# Patient Record
Sex: FEMALE | Race: WHITE | NOT HISPANIC OR LATINO | Employment: OTHER | URBAN - METROPOLITAN AREA
[De-identification: names, ages, dates, MRNs, and addresses within clinical notes are randomized per-mention and may not be internally consistent; named-entity substitution may affect disease eponyms.]

---

## 2017-05-26 ENCOUNTER — TRANSCRIBE ORDERS (OUTPATIENT)
Dept: ADMINISTRATIVE | Facility: HOSPITAL | Age: 52
End: 2017-05-26

## 2017-05-26 DIAGNOSIS — Z12.31 VISIT FOR SCREENING MAMMOGRAM: Primary | ICD-10-CM

## 2017-07-05 ENCOUNTER — APPOINTMENT (OUTPATIENT)
Dept: MAMMOGRAPHY | Facility: HOSPITAL | Age: 52
End: 2017-07-05
Attending: OBSTETRICS & GYNECOLOGY

## 2017-07-07 ENCOUNTER — HOSPITAL ENCOUNTER (OUTPATIENT)
Dept: MAMMOGRAPHY | Facility: HOSPITAL | Age: 52
Discharge: HOME | End: 2017-07-07
Attending: OBSTETRICS & GYNECOLOGY | Admitting: OBSTETRICS & GYNECOLOGY

## 2017-07-07 DIAGNOSIS — Z12.31 VISIT FOR SCREENING MAMMOGRAM: ICD-10-CM

## 2017-07-07 PROCEDURE — 77067 SCR MAMMO BI INCL CAD: CPT | Performed by: RADIOLOGY

## 2017-07-07 PROCEDURE — 77063 BREAST TOMOSYNTHESIS BI: CPT

## 2017-07-07 PROCEDURE — G0202 SCR MAMMO BI INCL CAD: HCPCS

## 2017-07-07 PROCEDURE — 77063 BREAST TOMOSYNTHESIS BI: CPT | Performed by: RADIOLOGY

## 2018-05-29 ENCOUNTER — TRANSCRIBE ORDERS (OUTPATIENT)
Dept: ADMINISTRATIVE | Facility: HOSPITAL | Age: 53
End: 2018-05-29

## 2018-05-29 DIAGNOSIS — Z12.31 VISIT FOR SCREENING MAMMOGRAM: Primary | ICD-10-CM

## 2018-07-09 ENCOUNTER — HOSPITAL ENCOUNTER (OUTPATIENT)
Dept: MAMMOGRAPHY | Facility: HOSPITAL | Age: 53
Discharge: HOME | End: 2018-07-09
Attending: OBSTETRICS & GYNECOLOGY | Admitting: OBSTETRICS & GYNECOLOGY

## 2018-07-09 DIAGNOSIS — Z12.31 VISIT FOR SCREENING MAMMOGRAM: ICD-10-CM

## 2018-07-09 PROCEDURE — 77067 SCR MAMMO BI INCL CAD: CPT | Performed by: RADIOLOGY

## 2018-07-09 PROCEDURE — 77067 SCR MAMMO BI INCL CAD: CPT

## 2018-07-09 PROCEDURE — 77063 BREAST TOMOSYNTHESIS BI: CPT

## 2018-07-09 PROCEDURE — 77063 BREAST TOMOSYNTHESIS BI: CPT | Performed by: RADIOLOGY

## 2019-08-12 ENCOUNTER — TRANSCRIBE ORDERS (OUTPATIENT)
Dept: ADMINISTRATIVE | Facility: HOSPITAL | Age: 54
End: 2019-08-12

## 2019-08-12 DIAGNOSIS — Z12.31 VISIT FOR SCREENING MAMMOGRAM: Primary | ICD-10-CM

## 2019-09-16 ENCOUNTER — HOSPITAL ENCOUNTER (OUTPATIENT)
Dept: MAMMOGRAPHY | Facility: HOSPITAL | Age: 54
Discharge: HOME | End: 2019-09-16
Admitting: OBSTETRICS & GYNECOLOGY

## 2019-09-16 DIAGNOSIS — Z12.31 VISIT FOR SCREENING MAMMOGRAM: ICD-10-CM

## 2019-09-16 PROCEDURE — 77067 SCR MAMMO BI INCL CAD: CPT

## 2019-09-16 PROCEDURE — 77063 BREAST TOMOSYNTHESIS BI: CPT | Performed by: RADIOLOGY

## 2019-09-16 PROCEDURE — 77067 SCR MAMMO BI INCL CAD: CPT | Performed by: RADIOLOGY

## 2019-09-16 PROCEDURE — 77063 BREAST TOMOSYNTHESIS BI: CPT

## 2020-07-27 ENCOUNTER — TRANSCRIBE ORDERS (OUTPATIENT)
Dept: OBSTETRICS AND GYNECOLOGY | Facility: CLINIC | Age: 55
End: 2020-07-27

## 2020-07-27 DIAGNOSIS — Z12.31 VISIT FOR SCREENING MAMMOGRAM: Primary | ICD-10-CM

## 2020-10-14 ENCOUNTER — APPOINTMENT (OUTPATIENT)
Dept: MAMMOGRAPHY | Facility: HOSPITAL | Age: 55
End: 2020-10-14

## 2020-11-13 ENCOUNTER — HOSPITAL ENCOUNTER (OUTPATIENT)
Dept: MAMMOGRAPHY | Facility: HOSPITAL | Age: 55
Discharge: HOME | End: 2020-11-13
Admitting: OBSTETRICS & GYNECOLOGY

## 2020-11-13 DIAGNOSIS — Z12.31 VISIT FOR SCREENING MAMMOGRAM: ICD-10-CM

## 2020-11-13 PROCEDURE — 77063 BREAST TOMOSYNTHESIS BI: CPT

## 2020-11-13 PROCEDURE — 77063 BREAST TOMOSYNTHESIS BI: CPT | Performed by: RADIOLOGY

## 2020-11-13 PROCEDURE — 77067 SCR MAMMO BI INCL CAD: CPT | Performed by: RADIOLOGY

## 2020-11-13 PROCEDURE — 77067 SCR MAMMO BI INCL CAD: CPT

## 2020-12-09 ENCOUNTER — APPOINTMENT (OUTPATIENT)
Dept: MAMMOGRAPHY | Facility: HOSPITAL | Age: 55
End: 2020-12-09

## 2021-08-20 ENCOUNTER — OFFICE VISIT (OUTPATIENT)
Dept: OBSTETRICS AND GYNECOLOGY | Facility: CLINIC | Age: 56
End: 2021-08-20

## 2021-08-20 VITALS
BODY MASS INDEX: 26.07 KG/M2 | WEIGHT: 124.2 LBS | SYSTOLIC BLOOD PRESSURE: 110 MMHG | DIASTOLIC BLOOD PRESSURE: 70 MMHG | HEIGHT: 58 IN

## 2021-08-20 DIAGNOSIS — Z01.419 ENCOUNTER FOR ANNUAL ROUTINE GYNECOLOGICAL EXAMINATION: Primary | ICD-10-CM

## 2021-08-20 DIAGNOSIS — N88.2 CERVICAL STENOSIS (UTERINE CERVIX): ICD-10-CM

## 2021-08-20 DIAGNOSIS — R87.810 CERVICAL HIGH RISK HUMAN PAPILLOMAVIRUS (HPV) DNA TEST POSITIVE: ICD-10-CM

## 2021-08-20 DIAGNOSIS — Z12.31 ENCOUNTER FOR SCREENING MAMMOGRAM FOR MALIGNANT NEOPLASM OF BREAST: ICD-10-CM

## 2021-08-20 DIAGNOSIS — D06.9 SEVERE DYSPLASIA OF CERVIX (CIN III): ICD-10-CM

## 2021-08-20 DIAGNOSIS — Z12.11 SCREENING FOR COLON CANCER: ICD-10-CM

## 2021-08-20 PROCEDURE — 99396 PREV VISIT EST AGE 40-64: CPT | Performed by: OBSTETRICS & GYNECOLOGY

## 2021-08-20 RX ORDER — LANOLIN ALCOHOL/MO/W.PET/CERES
1000 CREAM (GRAM) TOPICAL DAILY
COMMUNITY

## 2021-08-20 RX ORDER — DIPHENOXYLATE HYDROCHLORIDE AND ATROPINE SULFATE 2.5; .025 MG/1; MG/1
TABLET ORAL DAILY
COMMUNITY

## 2021-08-20 RX ORDER — SODIUM PHOSPHATE,MONO-DIBASIC 19G-7G/118
ENEMA (ML) RECTAL
COMMUNITY

## 2021-08-20 RX ORDER — CETIRIZINE HYDROCHLORIDE 10 MG/1
10 TABLET ORAL DAILY
COMMUNITY

## 2021-08-20 RX ORDER — SPIRONOLACTONE 100 MG/1
TABLET, FILM COATED ORAL
COMMUNITY
Start: 2021-08-15

## 2021-08-20 NOTE — PROGRESS NOTES
GYN Annual Exam     CC - Here for annual exam.        HPI  Janis Klein is a 55 y.o. female, , who presents for annual well woman exam.  She is postmenopausal.  Patient denies vaginal bleeding. ..  Patient reports problems with: hot flashes. There were no changes to her medical or surgical history since her last visit.. Partner Status: Marital Status: single.  New Partners since last visit: no.    Patient requests screening lab work today; states she is fasting.  Denies any problems with vaginal dryness or severe hot flashes and declines hormone placement therapy.    Additional OB/GYN History   Current contraception: contraceptive methods: Post menopausal status  Desires to: do not start contraception  On HRT? No  Last Pap :   Last Completed Pap Smear     This patient has no relevant Health Maintenance data.        History of abnormal Pap smear: Yes  Family history of uterine, colon, breast, or ovarian cancer: no  Performs monthly Self-Breast Exam: yes  Last mammogram:   Last Completed Mammogram          MAMMOGRAM (Every 2 Years) Next due on 2022  Mammo Screening Digital Tomosynthesis Bilateral With CAD    2019  Mammo Screening Digital Tomosynthesis Bilateral With CAD    2018  Mammo Screening Digital Tomosynthesis Bilateral With CAD    2017  Mammo Screening Digital Tomosynthesis Bilateral With CAD    2016  Mammo screening digital tomosynthesis bilateral    Only the first 5 history entries have been loaded, but more history exists.              Last colonoscopy:   Last Completed Colonoscopy     This patient has no relevant Health Maintenance data.        Last DEXA: None  Exercises Regularly: yes  Feelings of Anxiety or Depression: no      Tobacco Usage?: No   OB History        2    Para   2    Term   2            AB        Living   2       SAB        TAB        Ectopic        Molar        Multiple        Live Births   2                Health  "Maintenance   Topic Date Due   • Annual Gynecologic Pelvic and Breast Exam  Never done   • COLORECTAL CANCER SCREENING  Never done   • ANNUAL PHYSICAL  Never done   • TDAP/TD VACCINES (1 - Tdap) Never done   • ZOSTER VACCINE (1 of 2) Never done   • HEPATITIS C SCREENING  Never done   • PAP SMEAR  Never done   • INFLUENZA VACCINE  10/01/2021   • MAMMOGRAM  11/13/2022   • COVID-19 Vaccine  Completed   • Pneumococcal Vaccine 0-64  Aged Out       The additional following portions of the patient's history were reviewed and updated as appropriate: allergies, current medications, past family history, past medical history, past social history, past surgical history and problem list.    Review of Systems   Constitutional: Negative.    HENT: Negative.    Eyes: Negative.    Respiratory: Negative.    Cardiovascular: Negative.    Gastrointestinal: Negative.    Endocrine: Negative.    Genitourinary: Negative.    Musculoskeletal: Negative.    Skin: Negative.    Allergic/Immunologic: Negative.    Neurological: Negative.    Hematological: Negative.    Psychiatric/Behavioral: Negative.        I have reviewed and agree with the HPI, ROS, and historical information as entered above. Matias Ruiz MD    Objective   /70   Ht 147.3 cm (58\")   Wt 56.3 kg (124 lb 3.2 oz)   BMI 25.96 kg/m²     Physical Exam  Vitals and nursing note reviewed. Exam conducted with a chaperone present.   Constitutional:       Appearance: She is well-developed.   HENT:      Head: Normocephalic and atraumatic.   Neck:      Thyroid: No thyroid mass or thyromegaly.   Cardiovascular:      Rate and Rhythm: Normal rate and regular rhythm.      Heart sounds: No murmur heard.     Pulmonary:      Effort: Pulmonary effort is normal. No retractions.      Breath sounds: Normal breath sounds. No wheezing, rhonchi or rales.   Chest:      Chest wall: No mass or tenderness.      Breasts:         Right: Normal. No mass, nipple discharge, skin change or tenderness. "         Left: Normal. No mass, nipple discharge, skin change or tenderness.   Abdominal:      General: Bowel sounds are normal.      Palpations: Abdomen is soft. Abdomen is not rigid. There is no mass.      Tenderness: There is no abdominal tenderness. There is no guarding.      Hernia: No hernia is present. There is no hernia in the left inguinal area.   Genitourinary:     Labia:         Right: No rash, tenderness or lesion.         Left: No rash, tenderness or lesion.       Vagina: Normal. No vaginal discharge or lesions.      Cervix: No cervical motion tenderness, discharge, lesion or cervical bleeding.      Uterus: Normal. Not enlarged, not fixed and not tender.       Adnexa:         Right: No mass or tenderness.          Left: No mass or tenderness.        Rectum: No external hemorrhoid.   Musculoskeletal:      Cervical back: Normal range of motion. No muscular tenderness.   Neurological:      Mental Status: She is alert and oriented to person, place, and time.   Psychiatric:         Behavior: Behavior normal.            Assessment and Plan    Problem List Items Addressed This Visit     Screening for colon cancer    Overview     Had colonoscopy at [facility]. [place] at age 48.  Benign polyps were noted.  Repeat in 5 years recommended.  Patient wishes to schedule screening colonoscopy.         Relevant Orders    Ambulatory Referral to Gastroenterology (Completed)    Encounter for screening mammogram for malignant neoplasm of breast    Overview     Last mammogram 11/13/2020 was negative.  Has mammogram scheduled for November.         Severe dysplasia of cervix (ODILIA III)    Overview     Cone biopsy 5/27/2014.  Margins and ECC were negative.         Cervical high risk human papillomavirus (HPV) DNA test positive    Overview     2014.  HPV 16/18 were negative.         Cervical stenosis (uterine cervix)    Overview     Cervix flush with vaginal wall and os stenotic post cone biopsy.           Other Visit Diagnoses      Encounter for annual routine gynecological examination    -  Primary    Relevant Orders    Pap IG, Rfx HPV ASCU    Lipid Panel    CBC (No Diff)    Hemoglobin A1c    Vitamin D 25 Hydroxy    Comprehensive Metabolic Panel          1. GYN annual well woman exam.  55 years of age.  Mild hot flashes otherwise doing well.  2. Has mammogram scheduled in November.  3. Wishes to schedule colonoscopy at [facility].  4. Reviewed monthly self breast exams.  Instructed to call with lumps, pain, or breast discharge.  Yearly mammograms ordered.  5. Ordered mammogram today.  6. Recommended use of Vitamin D and getting adequate calcium in her diet. (1500mg)  7. Colonoscopy recommended.  8. Reccommended Flu Vaccine in Fall of each year.  9. RTC in 1 year or PRN with problems.  10. Other: Patient requesting screening lab work today.    Matias Ruiz MD  08/20/2021

## 2021-08-21 LAB
25(OH)D3+25(OH)D2 SERPL-MCNC: 68 NG/ML (ref 30–100)
ALBUMIN SERPL-MCNC: 4.9 G/DL (ref 3.5–5.2)
ALBUMIN/GLOB SERPL: 2 G/DL
ALP SERPL-CCNC: 101 U/L (ref 39–117)
ALT SERPL-CCNC: 16 U/L (ref 1–33)
AST SERPL-CCNC: 24 U/L (ref 1–32)
BILIRUB SERPL-MCNC: 0.5 MG/DL (ref 0–1.2)
BUN SERPL-MCNC: 16 MG/DL (ref 6–20)
BUN/CREAT SERPL: 19.5 (ref 7–25)
CALCIUM SERPL-MCNC: 10 MG/DL (ref 8.6–10.5)
CHLORIDE SERPL-SCNC: 101 MMOL/L (ref 98–107)
CHOLEST SERPL-MCNC: 246 MG/DL (ref 0–200)
CO2 SERPL-SCNC: 27.5 MMOL/L (ref 22–29)
CREAT SERPL-MCNC: 0.82 MG/DL (ref 0.57–1)
ERYTHROCYTE [DISTWIDTH] IN BLOOD BY AUTOMATED COUNT: 12.7 % (ref 12.3–15.4)
GLOBULIN SER CALC-MCNC: 2.4 GM/DL
GLUCOSE SERPL-MCNC: 71 MG/DL (ref 65–99)
HBA1C MFR BLD: 5.1 % (ref 4.8–5.6)
HCT VFR BLD AUTO: 43.5 % (ref 34–46.6)
HDLC SERPL-MCNC: 48 MG/DL (ref 40–60)
HGB BLD-MCNC: 14.1 G/DL (ref 12–15.9)
LDLC SERPL CALC-MCNC: 179 MG/DL (ref 0–100)
MCH RBC QN AUTO: 30.5 PG (ref 26.6–33)
MCHC RBC AUTO-ENTMCNC: 32.4 G/DL (ref 31.5–35.7)
MCV RBC AUTO: 94.2 FL (ref 79–97)
PLATELET # BLD AUTO: 260 10*3/MM3 (ref 140–450)
POTASSIUM SERPL-SCNC: 4.1 MMOL/L (ref 3.5–5.2)
PROT SERPL-MCNC: 7.3 G/DL (ref 6–8.5)
RBC # BLD AUTO: 4.62 10*6/MM3 (ref 3.77–5.28)
SODIUM SERPL-SCNC: 136 MMOL/L (ref 136–145)
TRIGL SERPL-MCNC: 109 MG/DL (ref 0–150)
VLDLC SERPL CALC-MCNC: 19 MG/DL (ref 5–40)
WBC # BLD AUTO: 8.04 10*3/MM3 (ref 3.4–10.8)

## 2021-08-31 DIAGNOSIS — Z01.419 ENCOUNTER FOR ANNUAL ROUTINE GYNECOLOGICAL EXAMINATION: ICD-10-CM

## 2021-08-31 PROBLEM — Z12.4 SCREENING FOR CERVICAL CANCER: Status: ACTIVE | Noted: 2021-08-31

## 2021-09-06 ENCOUNTER — TRANSCRIBE ORDERS (OUTPATIENT)
Dept: LAB | Facility: HOSPITAL | Age: 56
End: 2021-09-06

## 2021-09-06 ENCOUNTER — LAB (OUTPATIENT)
Dept: LAB | Facility: HOSPITAL | Age: 56
End: 2021-09-06

## 2021-09-06 DIAGNOSIS — Z20.822 COVID-19 RULED OUT: ICD-10-CM

## 2021-09-06 DIAGNOSIS — Z20.822 COVID-19 RULED OUT: Primary | ICD-10-CM

## 2021-09-06 LAB — SARS-COV-2 RNA NOSE QL NAA+PROBE: NOT DETECTED

## 2021-09-06 PROCEDURE — C9803 HOPD COVID-19 SPEC COLLECT: HCPCS

## 2021-09-06 PROCEDURE — U0004 COV-19 TEST NON-CDC HGH THRU: HCPCS

## 2021-12-22 ENCOUNTER — TRANSCRIBE ORDERS (OUTPATIENT)
Dept: ADMINISTRATIVE | Facility: HOSPITAL | Age: 56
End: 2021-12-22

## 2021-12-22 DIAGNOSIS — Z12.31 ENCOUNTER FOR SCREENING MAMMOGRAM FOR MALIGNANT NEOPLASM OF BREAST: Primary | ICD-10-CM

## 2022-02-18 ENCOUNTER — HOSPITAL ENCOUNTER (OUTPATIENT)
Dept: MAMMOGRAPHY | Facility: HOSPITAL | Age: 57
Discharge: HOME | End: 2022-02-18
Admitting: OBSTETRICS & GYNECOLOGY

## 2022-02-18 DIAGNOSIS — Z12.31 ENCOUNTER FOR SCREENING MAMMOGRAM FOR MALIGNANT NEOPLASM OF BREAST: ICD-10-CM

## 2022-02-18 PROCEDURE — 77067 SCR MAMMO BI INCL CAD: CPT | Performed by: RADIOLOGY

## 2022-02-18 PROCEDURE — 77063 BREAST TOMOSYNTHESIS BI: CPT

## 2022-02-18 PROCEDURE — 77067 SCR MAMMO BI INCL CAD: CPT

## 2022-02-18 PROCEDURE — 77063 BREAST TOMOSYNTHESIS BI: CPT | Performed by: RADIOLOGY

## 2022-09-13 ENCOUNTER — OFFICE VISIT (OUTPATIENT)
Dept: OBSTETRICS AND GYNECOLOGY | Facility: CLINIC | Age: 57
End: 2022-09-13

## 2022-09-13 VITALS
DIASTOLIC BLOOD PRESSURE: 64 MMHG | HEIGHT: 58 IN | WEIGHT: 133.6 LBS | SYSTOLIC BLOOD PRESSURE: 108 MMHG | BODY MASS INDEX: 28.04 KG/M2

## 2022-09-13 DIAGNOSIS — Z01.419 WOMEN'S ANNUAL ROUTINE GYNECOLOGICAL EXAMINATION: Primary | ICD-10-CM

## 2022-09-13 DIAGNOSIS — R53.83 FATIGUE, UNSPECIFIED TYPE: ICD-10-CM

## 2022-09-13 DIAGNOSIS — Z00.00 ROUTINE GENERAL MEDICAL EXAMINATION AT A HEALTH CARE FACILITY: ICD-10-CM

## 2022-09-13 PROCEDURE — 99396 PREV VISIT EST AGE 40-64: CPT

## 2022-09-13 RX ORDER — OMALIZUMAB 150 MG/ML
INJECTION, SOLUTION SUBCUTANEOUS
COMMUNITY

## 2022-09-13 RX ORDER — EPINEPHRINE 0.3 MG/.3ML
INJECTION SUBCUTANEOUS
COMMUNITY

## 2022-09-13 NOTE — PROGRESS NOTES
Gynecologic Annual Exam Note        GYN Annual Exam     CC - Here for annual exam.        HPI  Janis Klein is a 56 y.o. female, , who presents for annual well woman exam as a established patient.  She is postmenopausal. Denies vaginal bleeding.  Patient reports problems with: weight gain . There were no changes to her medical or surgical history since her last visit.. Partner Status: Marital Status: .  She is is not currently sexually active. STD testing recommendations have been explained to the patient and she does not desire STD testing.    Additional OB/GYN History   On HRT? No    Last Pap : 21. Results: negative. HPV: not done  Last Completed Pap Smear          Ordered - PAP SMEAR (Every 3 Years) Ordered on 2021  SCANNED - PAP SMEAR              History of abnormal Pap smear: yes - years ago   Family history of uterine, colon, breast, or ovarian cancer: no  Performs monthly Self-Breast Exam: yes  Last mammogram: 2022. Done at .    Last Completed Mammogram          MAMMOGRAM (Every 2 Years) Next due on 2022  Mammo Screening Digital Tomosynthesis Bilateral With CAD    2020  Mammo Screening Digital Tomosynthesis Bilateral With CAD    2019  Mammo Screening Digital Tomosynthesis Bilateral With CAD    2018  Mammo Screening Digital Tomosynthesis Bilateral With CAD    2017  Mammo Screening Digital Tomosynthesis Bilateral With CAD    Only the first 5 history entries have been loaded, but more history exists.              Last colonoscopy: has had a colonoscopy 4 month(s) ago. Scanned in media 2022. Rec 10 yr f/u    Last Completed Colonoscopy     This patient has no relevant Health Maintenance data.            Last bone density scan (DEXA): None  Exercises Regularly: yes  Feelings of Anxiety or Depression: no      Tobacco Usage?: No       Current Outpatient Medications:   •  ALLERGY SERUM SUBLINGUAL DROPS, Place   under the tongue 1 (One) Time., Disp: , Rfl:   •  cetirizine (zyrTEC) 10 MG tablet, Take 10 mg by mouth Daily., Disp: , Rfl:   •  EPINEPHrine (EPIPEN) 0.3 MG/0.3ML solution auto-injector injection, Auvi-Q 0.3 mg/0.3 mL injection, auto-injector  Take 1 auto by injection route., Disp: , Rfl:   •  glucosamine-chondroitin 500-400 MG capsule capsule, Take  by mouth 3 (Three) Times a Day With Meals., Disp: , Rfl:   •  multivitamin (THERAGRAN) tablet tablet, Take  by mouth Daily., Disp: , Rfl:   •  omalizumab (Xolair) 150 MG/ML solution prefilled syringe injection, Xolair 150 mg/mL subcutaneous syringe, Disp: , Rfl:   •  spironolactone (ALDACTONE) 100 MG tablet, , Disp: , Rfl:   •  Zinc Sulfate (ZINC 15 PO), Take  by mouth., Disp: , Rfl:   •  vitamin B-12 (CYANOCOBALAMIN) 1000 MCG tablet, Take 1,000 mcg by mouth Daily., Disp: , Rfl:     Patient denies the need for medication refills today.    OB History        2    Para   2    Term   2            AB        Living   2       SAB        IAB        Ectopic        Molar        Multiple        Live Births   2                History reviewed. No pertinent past medical history.     Past Surgical History:   Procedure Laterality Date   • BREAST BIOPSY Right     Stereotactic       Health Maintenance   Topic Date Due   • COLORECTAL CANCER SCREENING  Never done   • TDAP/TD VACCINES (1 - Tdap) Never done   • ZOSTER VACCINE (1 of 2) Never done   • HEPATITIS C SCREENING  Never done   • COVID-19 Vaccine (4 - Booster for Moderna series) 2022   • INFLUENZA VACCINE  10/01/2022   • ANNUAL PHYSICAL  2023   • Annual Gynecologic Pelvic and Breast Exam  2023   • MAMMOGRAM  2024   • PAP SMEAR  2024   • Pneumococcal Vaccine 0-64  Aged Out       The additional following portions of the patient's history were reviewed and updated as appropriate: allergies, current medications, past family history, past medical history, past social history and past  "surgical history.    Review of Systems   Constitutional: Positive for fatigue and unexpected weight gain (Approx 15 lbs over past yr). Negative for activity change, appetite change, chills, diaphoresis and fever.   Respiratory: Negative.  Negative for shortness of breath.    Cardiovascular: Negative.  Negative for chest pain.   Gastrointestinal: Negative.    Endocrine: Positive for heat intolerance. Negative for cold intolerance.   Genitourinary: Negative for breast discharge, breast lump, breast pain and vaginal bleeding.   Allergic/Immunologic:        Severe allergies. Xolair inj Q 28 days   Psychiatric/Behavioral: Negative.  Negative for depressed mood. The patient is not nervous/anxious.        I have reviewed and agree with the HPI, ROS, and historical information as entered above. Pili Roberts, APRN    Objective   /64 (BP Location: Left arm, Patient Position: Sitting, Cuff Size: Adult)   Ht 147.3 cm (57.99\")   Wt 60.6 kg (133 lb 9.6 oz)   Breastfeeding No   BMI 27.93 kg/m²     Physical Exam  Vitals and nursing note reviewed. Exam conducted with a chaperone present.   Constitutional:       Appearance: Normal appearance. She is well-developed.   HENT:      Head: Normocephalic and atraumatic.   Neck:      Thyroid: No thyroid mass or thyromegaly.   Cardiovascular:      Rate and Rhythm: Normal rate and regular rhythm.      Pulses: Normal pulses.      Heart sounds: Normal heart sounds, S1 normal and S2 normal. No murmur heard.  Pulmonary:      Effort: Pulmonary effort is normal. No retractions.      Breath sounds: Normal breath sounds. No wheezing, rhonchi or rales.   Chest:      Chest wall: No mass or tenderness.   Breasts:      Right: Normal. No mass, nipple discharge, skin change or tenderness.      Left: Normal. No mass, nipple discharge, skin change or tenderness.       Abdominal:      General: Bowel sounds are normal.      Palpations: Abdomen is soft. Abdomen is not rigid. There is no mass.      " Tenderness: There is no abdominal tenderness. There is no guarding.      Hernia: No hernia is present. There is no hernia in the left inguinal area.   Genitourinary:     General: Normal vulva.      Labia:         Right: No rash, tenderness or lesion.         Left: No rash, tenderness or lesion.       Vagina: Normal. No vaginal discharge or lesions.      Cervix: No cervical motion tenderness, discharge, lesion or cervical bleeding.      Uterus: Normal. Not enlarged, not fixed and not tender.       Adnexa:         Right: No mass or tenderness.          Left: No mass or tenderness.        Rectum: No external hemorrhoid.   Musculoskeletal:         General: Normal range of motion.      Cervical back: Normal range of motion. No muscular tenderness.   Lymphadenopathy:      Cervical: No cervical adenopathy.      Right cervical: No superficial cervical adenopathy.     Left cervical: No superficial cervical adenopathy.   Neurological:      Mental Status: She is alert and oriented to person, place, and time.   Psychiatric:         Mood and Affect: Mood normal.         Behavior: Behavior normal.            Assessment and Plan    Problem List Items Addressed This Visit    None     Visit Diagnoses     Women's annual routine gynecological examination    -  Primary    Relevant Orders    LIQUID-BASED PAP SMEAR, [facility] ([facility])    CBC & Differential    Comprehensive Metabolic Panel    Vitamin D 25 Hydroxy    TSH Rfx On Abnormal To Free T4    Fatigue, unspecified type        Relevant Orders    CBC & Differential    Comprehensive Metabolic Panel    Vitamin D 25 Hydroxy    TSH Rfx On Abnormal To Free T4    Routine general medical examination at a health care facility        Relevant Orders    LIQUID-BASED PAP SMEAR, [facility] ([facility])    CBC & Differential    Comprehensive Metabolic Panel    Vitamin D 25 Hydroxy    TSH Rfx On Abnormal To Free T4          1. GYN annual well woman exam.   2. Reviewed monthly self breast exams.   Instructed to call with lumps, pain, or breast discharge.  Yearly mammograms ordered.  3. Recommend annual mammograms  4. Recommended use of Vitamin D and getting adequate calcium in her diet. (1500mg)- Bone health information sheet given to patient.   5. Reviewed exercise as a preventative health measures.   6. Reviewed BMI and weight loss as preventative health measures.   7. Colonoscopy 5/27/2022. Recommend  10 yr f/u.  8. Symptoms of menopausal transition reviewed with patient.  Reviewed risks/benefits of OTC, non-hormonal and hormonal treatment for vasomotor and other menopausal symptoms.  9. Labs ordered.   10. Return in about 1 year (around 9/13/2023) for Annual physical or sooner if needed.     Pili Roberts, APRN  09/13/2022

## 2022-09-14 LAB
25(OH)D3+25(OH)D2 SERPL-MCNC: 56.2 NG/ML (ref 30–100)
ALBUMIN SERPL-MCNC: 4.8 G/DL (ref 3.5–5.2)
ALBUMIN/GLOB SERPL: 2.3 G/DL
ALP SERPL-CCNC: 110 U/L (ref 39–117)
ALT SERPL-CCNC: 16 U/L (ref 1–33)
AST SERPL-CCNC: 21 U/L (ref 1–32)
BASOPHILS # BLD AUTO: 0.04 10*3/MM3 (ref 0–0.2)
BASOPHILS NFR BLD AUTO: 0.6 % (ref 0–1.5)
BILIRUB SERPL-MCNC: <0.2 MG/DL (ref 0–1.2)
BUN SERPL-MCNC: 18 MG/DL (ref 6–20)
BUN/CREAT SERPL: 25.7 (ref 7–25)
CALCIUM SERPL-MCNC: 9.1 MG/DL (ref 8.6–10.5)
CHLORIDE SERPL-SCNC: 99 MMOL/L (ref 98–107)
CO2 SERPL-SCNC: 29.3 MMOL/L (ref 22–29)
CREAT SERPL-MCNC: 0.7 MG/DL (ref 0.57–1)
EGFRCR-CYS SERPLBLD CKD-EPI 2021: 100.4 ML/MIN/1.73
EOSINOPHIL # BLD AUTO: 0.07 10*3/MM3 (ref 0–0.4)
EOSINOPHIL NFR BLD AUTO: 1 % (ref 0.3–6.2)
ERYTHROCYTE [DISTWIDTH] IN BLOOD BY AUTOMATED COUNT: 12.8 % (ref 12.3–15.4)
GLOBULIN SER CALC-MCNC: 2.1 GM/DL
GLUCOSE SERPL-MCNC: 75 MG/DL (ref 65–99)
HCT VFR BLD AUTO: 41.9 % (ref 34–46.6)
HGB BLD-MCNC: 13.7 G/DL (ref 12–15.9)
IMM GRANULOCYTES # BLD AUTO: 0.02 10*3/MM3 (ref 0–0.05)
IMM GRANULOCYTES NFR BLD AUTO: 0.3 % (ref 0–0.5)
LYMPHOCYTES # BLD AUTO: 2.47 10*3/MM3 (ref 0.7–3.1)
LYMPHOCYTES NFR BLD AUTO: 34.5 % (ref 19.6–45.3)
MCH RBC QN AUTO: 30.7 PG (ref 26.6–33)
MCHC RBC AUTO-ENTMCNC: 32.7 G/DL (ref 31.5–35.7)
MCV RBC AUTO: 93.9 FL (ref 79–97)
MONOCYTES # BLD AUTO: 0.45 10*3/MM3 (ref 0.1–0.9)
MONOCYTES NFR BLD AUTO: 6.3 % (ref 5–12)
NEUTROPHILS # BLD AUTO: 4.11 10*3/MM3 (ref 1.7–7)
NEUTROPHILS NFR BLD AUTO: 57.3 % (ref 42.7–76)
NRBC BLD AUTO-RTO: 0 /100 WBC (ref 0–0.2)
PLATELET # BLD AUTO: 234 10*3/MM3 (ref 140–450)
POTASSIUM SERPL-SCNC: 4 MMOL/L (ref 3.5–5.2)
PROT SERPL-MCNC: 6.9 G/DL (ref 6–8.5)
RBC # BLD AUTO: 4.46 10*6/MM3 (ref 3.77–5.28)
REF LAB TEST METHOD: NORMAL
SODIUM SERPL-SCNC: 138 MMOL/L (ref 136–145)
TSH SERPL DL<=0.005 MIU/L-ACNC: 2.88 UIU/ML (ref 0.27–4.2)
WBC # BLD AUTO: 7.16 10*3/MM3 (ref 3.4–10.8)

## 2023-07-11 PROBLEM — Z00.00 WELL ADULT EXAM: Status: ACTIVE | Noted: 2021-08-20

## 2023-09-20 ENCOUNTER — OFFICE VISIT (OUTPATIENT)
Dept: OBSTETRICS AND GYNECOLOGY | Facility: CLINIC | Age: 58
End: 2023-09-20
Payer: COMMERCIAL

## 2023-09-20 VITALS
BODY MASS INDEX: 28.42 KG/M2 | HEIGHT: 58 IN | SYSTOLIC BLOOD PRESSURE: 118 MMHG | DIASTOLIC BLOOD PRESSURE: 74 MMHG | WEIGHT: 135.4 LBS

## 2023-09-20 DIAGNOSIS — Z01.419 WOMEN'S ANNUAL ROUTINE GYNECOLOGICAL EXAMINATION: Primary | ICD-10-CM

## 2023-09-20 RX ORDER — MELOXICAM 15 MG/1
1 TABLET ORAL DAILY
COMMUNITY
Start: 2023-08-29

## 2023-09-20 NOTE — PROGRESS NOTES
Gynecologic Annual Exam Note        GYN Annual Exam     CC - Here for annual exam.        HPI  Janis Klein is a 57 y.o. female, , who presents for annual well woman exam as a established patient.  She is postmenopausal. Denies vaginal bleeding.  Patient reports problems with:  none . There were no changes to her medical or surgical history since her last visit.. Partner Status: Marital Status: .  She is is not currently sexually active. STD testing recommendations have been explained to the patient and she does not desire STD testing.    Additional OB/GYN History   On HRT? No    Last Pap : 2022. Results: negative. HPV: not done.   Last Completed Pap Smear            PAP SMEAR (Every 3 Years) Order placed this encounter      2022  LIQUID-BASED PAP SMEAR, [facility] ([facility])    2021  SCANNED - PAP SMEAR    2020  Done - [place]-Neg                  History of abnormal Pap smear: yes - many years ago  Family history of uterine, colon, breast, or ovarian cancer: yes - Maternal cousin-Breast  Performs monthly Self-Breast Exam: yes  Last mammogram: 2023. Done at .    Last Completed Mammogram            MAMMOGRAM (Yearly) Next due on 2023  Mammo Screening Digital Tomosynthesis Bilateral With CAD    2022  Mammo Screening Digital Tomosynthesis Bilateral With CAD    2020  Mammo Screening Digital Tomosynthesis Bilateral With CAD    2019  Mammo Screening Digital Tomosynthesis Bilateral With CAD    2018  Mammo Screening Digital Tomosynthesis Bilateral With CAD    Only the first 5 history entries have been loaded, but more history exists.                  Last colonoscopy: 2022    Last Completed Colonoscopy            COLORECTAL CANCER SCREENING (COLONOSCOPY - Every 10 Years) Next due on 2022  SCANNED - COLONOSCOPY    2015  COLONOSCOPY (Done - [place])    2015  SCANNED - COLONOSCOPY                       Last bone density scan (DEXA): None  Exercises Regularly: no  Feelings of Anxiety or Depression: no      Tobacco Usage?: No       Current Outpatient Medications:     ALLERGY SERUM SUBLINGUAL DROPS, Place  under the tongue 1 (One) Time., Disp: , Rfl:     EPINEPHrine (EPIPEN) 0.3 MG/0.3ML solution auto-injector injection, Auvi-Q 0.3 mg/0.3 mL injection, auto-injector  Take 1 auto by injection route., Disp: , Rfl:     glucosamine-chondroitin 500-400 MG capsule capsule, Take  by mouth 3 (Three) Times a Day With Meals., Disp: , Rfl:     levocetirizine (XYZAL) 5 MG tablet, levocetirizine 5 mg tablet, Disp: , Rfl:     meloxicam (MOBIC) 15 MG tablet, Take 1 tablet by mouth Daily., Disp: , Rfl:     omalizumab (Xolair) 150 MG/ML solution prefilled syringe injection, 2 injections every 28 days, Disp: , Rfl:     pravastatin (Pravachol) 40 MG tablet, Take 1 tablet by mouth Daily., Disp: 90 tablet, Rfl: 3    spironolactone (ALDACTONE) 100 MG tablet, , Disp: , Rfl:     Patient denies the need for medication refills today.    OB History          3    Para   2    Term   2            AB   1    Living   2         SAB   1    IAB        Ectopic        Molar        Multiple        Live Births   2                Past Medical History:   Diagnosis Date    Cervical high risk human papillomavirus (HPV) DNA test positive 2014    [place]-16/18 neg 14    Cervical stricture or stenosis 2014    [place]    Dysmenorrhea     Irregular menses     [place]    IUD check up 2011    [place]-Mirena-removal 2014    Menorrhagia     Moderate dysplasia of cervix (ODILIA II) 2014    [place]        Past Surgical History:   Procedure Laterality Date    BREAST BIOPSY Right     Stereotactic    CERVICAL CONE BIOPSY      [place]-14- CINII for cervix and +ECC    CRYOABLATION      -carcinoma in situ    INTERNAL MAXILLARY ARTERY AND ETHMOID ARTERY LIGATION Right 10/2022    LASIK       "2004-[place]    PRE-MALIGNANT / BENIGN SKIN LESION EXCISION      12/2021-[place]    WISDOM TOOTH EXTRACTION  approx 1984       Health Maintenance   Topic Date Due    DXA SCAN  Never done    TDAP/TD VACCINES (1 - Tdap) Never done    HEPATITIS C SCREENING  Never done    Annual Gynecologic Pelvic and Breast Exam  09/14/2023    COVID-19 Vaccine (4 - Moderna series) 12/13/2030 (Originally 12/27/2021)    ZOSTER VACCINE (1 of 2) 12/19/2030 (Originally 11/5/2013)    INFLUENZA VACCINE  10/01/2023    ANNUAL PHYSICAL  07/11/2024    BMI FOLLOWUP  07/11/2024    MAMMOGRAM  07/20/2024    PAP SMEAR  09/13/2025    COLORECTAL CANCER SCREENING  05/22/2032    Pneumococcal Vaccine 0-64  Aged Out       The additional following portions of the patient's history were reviewed and updated as appropriate: allergies, current medications, past family history, past medical history, past social history, and past surgical history.    Review of Systems   Constitutional: Negative.    Respiratory: Negative.     Cardiovascular: Negative.    Gastrointestinal: Negative.    Genitourinary: Negative.    Psychiatric/Behavioral: Negative.       I have reviewed and agree with the HPI, ROS, and historical information as entered above. SIA Linares      Objective   /74 (BP Location: Left arm, Patient Position: Sitting, Cuff Size: Adult)   Ht 147.3 cm (57.99\")   Wt 61.4 kg (135 lb 6.4 oz)   BMI 28.31 kg/m²     Physical Exam  Vitals and nursing note reviewed. Exam conducted with a chaperone present.   Constitutional:       General: She is not in acute distress.     Appearance: Normal appearance. She is well-developed and normal weight. She is not ill-appearing.   Neck:      Thyroid: No thyroid mass or thyromegaly.   Pulmonary:      Effort: Pulmonary effort is normal. No respiratory distress or retractions.   Chest:      Chest wall: No mass.   Breasts:     Right: Normal. No mass, nipple discharge, skin change or tenderness.      Left: Normal. No " mass, nipple discharge, skin change or tenderness.   Abdominal:      General: There is no distension.      Palpations: Abdomen is soft. Abdomen is not rigid. There is no mass.      Tenderness: There is no abdominal tenderness. There is no guarding or rebound.      Hernia: No hernia is present.   Genitourinary:     General: Normal vulva.      Exam position: Lithotomy position.      Labia:         Right: No rash, tenderness or lesion.         Left: No rash, tenderness or lesion.       Vagina: Normal. No vaginal discharge or lesions.      Cervix: Normal.      Uterus: Normal. Not enlarged, not fixed and not tender.       Adnexa: Right adnexa normal and left adnexa normal.        Right: No mass or tenderness.          Left: No mass or tenderness.        Rectum: Normal. No external hemorrhoid.      Comments: Cervix stenotic  Musculoskeletal:      Cervical back: No muscular tenderness.   Skin:     General: Skin is warm and dry.   Neurological:      Mental Status: She is alert and oriented to person, place, and time.   Psychiatric:         Mood and Affect: Mood normal.         Behavior: Behavior normal.          Assessment and Plan    Problem List Items Addressed This Visit    None  Visit Diagnoses       Women's annual routine gynecological examination    -  Primary            GYN annual well woman exam. Pap obtained today per pt request, does them yearly due to prior history of ODILIA III.  Reviewed monthly self breast exams.  Instructed to call with lumps, pain, or breast discharge.  Yearly mammograms ordered.  Recommended use of Vitamin D and getting adequate calcium in her diet. (1500mg)  Reviewed BMI and weight loss as preventative health measures.   Recommended Flu Vaccine in Fall of each year.  Up to date on colonscopy and mammogram.  Return in about 1 year (around 9/20/2024) for Annual physical.     SIA Linares  09/20/2023

## 2023-09-21 LAB — REF LAB TEST METHOD: NORMAL

## 2024-06-20 ENCOUNTER — TRANSCRIBE ORDERS (OUTPATIENT)
Dept: ADMINISTRATIVE | Facility: HOSPITAL | Age: 59
End: 2024-06-20
Payer: COMMERCIAL

## 2024-06-20 DIAGNOSIS — Z12.31 VISIT FOR SCREENING MAMMOGRAM: Primary | ICD-10-CM

## 2024-07-01 RX ORDER — PRAVASTATIN SODIUM 40 MG
40 TABLET ORAL DAILY
Qty: 90 TABLET | Refills: 3 | Status: SHIPPED | OUTPATIENT
Start: 2024-07-01

## 2024-07-25 ENCOUNTER — TELEPHONE (OUTPATIENT)
Dept: INTERNAL MEDICINE | Facility: CLINIC | Age: 59
End: 2024-07-25
Payer: COMMERCIAL

## 2024-07-25 NOTE — TELEPHONE ENCOUNTER
Caller: Janis Klein    Relationship: Self    Best call back number: [phone]     What is the best time to reach you: ANY    Who are you requesting to speak with (clinical staff, provider,  specific staff member): NURSE    Do you know the name of the person who called: PATIENT    What was the call regarding: IS PATIENT UP TO DATE OF TDAP?  HAVING A NEW BABY AROUND.      Is it okay if the provider responds through MyChart: PHONE CALL PLEASE

## 2024-07-29 ENCOUNTER — CLINICAL SUPPORT (OUTPATIENT)
Dept: INTERNAL MEDICINE | Facility: CLINIC | Age: 59
End: 2024-07-29
Payer: COMMERCIAL

## 2024-07-29 PROCEDURE — 90715 TDAP VACCINE 7 YRS/> IM: CPT | Performed by: FAMILY MEDICINE

## 2024-07-29 PROCEDURE — 90471 IMMUNIZATION ADMIN: CPT | Performed by: FAMILY MEDICINE

## 2024-08-08 ENCOUNTER — HOSPITAL ENCOUNTER (OUTPATIENT)
Dept: MAMMOGRAPHY | Facility: HOSPITAL | Age: 59
Discharge: HOME | End: 2024-08-08
Admitting: OBSTETRICS & GYNECOLOGY
Payer: COMMERCIAL

## 2024-08-08 DIAGNOSIS — Z12.31 VISIT FOR SCREENING MAMMOGRAM: ICD-10-CM

## 2024-08-08 PROCEDURE — 77063 BREAST TOMOSYNTHESIS BI: CPT

## 2024-08-08 PROCEDURE — 77067 SCR MAMMO BI INCL CAD: CPT

## 2024-08-14 ENCOUNTER — OFFICE VISIT (OUTPATIENT)
Dept: INTERNAL MEDICINE | Facility: CLINIC | Age: 59
End: 2024-08-14
Payer: COMMERCIAL

## 2024-08-14 VITALS
BODY MASS INDEX: 27.12 KG/M2 | DIASTOLIC BLOOD PRESSURE: 80 MMHG | SYSTOLIC BLOOD PRESSURE: 98 MMHG | WEIGHT: 129.2 LBS | TEMPERATURE: 98.1 F | RESPIRATION RATE: 18 BRPM | HEART RATE: 80 BPM | OXYGEN SATURATION: 97 % | HEIGHT: 58 IN

## 2024-08-14 DIAGNOSIS — Z00.00 WELL ADULT EXAM: Primary | ICD-10-CM

## 2024-08-14 DIAGNOSIS — Z13.0 SCREENING FOR BLOOD DISEASE: ICD-10-CM

## 2024-08-14 DIAGNOSIS — Z78.0 POSTMENOPAUSAL: ICD-10-CM

## 2024-08-14 DIAGNOSIS — E78.00 PURE HYPERCHOLESTEROLEMIA: ICD-10-CM

## 2024-08-14 DIAGNOSIS — Z13.29 SCREENING FOR ENDOCRINE DISORDER: ICD-10-CM

## 2024-08-14 PROCEDURE — 99396 PREV VISIT EST AGE 40-64: CPT | Performed by: FAMILY MEDICINE

## 2024-08-14 RX ORDER — FAMOTIDINE 20 MG/1
1 TABLET, FILM COATED ORAL AS NEEDED
COMMUNITY

## 2024-08-14 RX ORDER — CHLORPHENIRAMINE MALEATE 4 MG/1
1 TABLET ORAL EVERY EVENING
COMMUNITY

## 2024-08-14 RX ORDER — MONTELUKAST SODIUM 10 MG/1
1 TABLET ORAL DAILY
COMMUNITY

## 2024-08-14 NOTE — PROGRESS NOTES
Janis Klein is a 58 y.o. female.    Chief Complaint   Patient presents with    Annual Exam       HPI   History of Present Illness  The patient presents today for an annual physical and to follow up on hyperlipidemia.    She is currently on pravastatin for cholesterol management and reports no side effects. She maintains a healthy diet and began exercising again in March 2024.    She reports no chest pain, shortness of breath, nausea, vomiting, diarrhea, or constipation. She attributes her cough to allergies. She also reports no hot flashes, cold, chills, difficulty urinating, body aches, rashes, bruises, frequent headaches, anxiety, or depression.    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    Dental cleanings are part of her routine every six months, but she does not have regular eye exams. She underwent LASIK surgery 20 years ago and now uses reading glasses. Her next Pap smear is scheduled for October 1, 2024.  Colonoscopy was completed 2 years ago.  Mammogram completed last week.      IMMUNIZATIONS  She has received both shingles vaccines since her last visit. She received tetanus vaccine last month. She received influenza vaccine last fall. She has received 2 COVID-19 vaccines plus the booster.    The following portions of the patient's history were reviewed and updated as appropriate: allergies, current medications, past family history, past medical history, past social history, past surgical history and problem list.     Past Medical History:   Diagnosis Date    Cervical high risk human papillomavirus (HPV) DNA test positive 05/14/2014    [place]-16/18 neg 04/28/14    Cervical stricture or stenosis 09/29/2014    [place]    Dysmenorrhea     Irregular menses     [place]    IUD check up 04/13/2011    [place]-Mirena-removal 09/2014    Menorrhagia     Moderate dysplasia of cervix  (ODILIA II) 2014    [place]       Past Surgical History:   Procedure Laterality Date    BREAST BIOPSY Right 2008    Stereotactic    CERVICAL CONE BIOPSY      [place]-14- CINII for cervix and +ECC    CRYOABLATION      -carcinoma in situ    INTERNAL MAXILLARY ARTERY AND ETHMOID ARTERY LIGATION Right 10/2022    LASIK      -[place]    PRE-MALIGNANT / BENIGN SKIN LESION EXCISION      2021-[place]    WISDOM TOOTH EXTRACTION  approx 1984       Family History   Problem Relation Age of Onset    Hypertension Father     Hypothyroidism Mother     Hypertension Mother     Cirrhosis Brother         liver    Hypertension Maternal Grandmother     Breast cancer Cousin         maternal cousin  age 40    Hypertension Brother     Hypertension Brother     Ovarian cancer Neg Hx     Colon cancer Neg Hx     Uterine cancer Neg Hx        Social History     Socioeconomic History    Marital status:    Tobacco Use    Smoking status: Never     Passive exposure: Never    Smokeless tobacco: Never    Tobacco comments:     Previously a Social smoker   Vaping Use    Vaping status: Never Used   Substance and Sexual Activity    Alcohol use: Yes     Comment: I do not have a drink very often.    Drug use: Never    Sexual activity: Not Currently     Partners: Male     Birth control/protection: Post-menopausal       Allergies   Allergen Reactions    Codeine Hives and Unknown - Low Severity    Nickel Hives and Unknown - Low Severity    Sulfa Antibiotics Hives         Current Outpatient Medications:     Aller-Chlor 4 MG tablet, Take 1 tablet by mouth Every Evening., Disp: , Rfl:     ALLERGY SERUM SUBLINGUAL DROPS, Place  under the tongue 1 (One) Time., Disp: , Rfl:     EPINEPHrine (EPIPEN) 0.3 MG/0.3ML solution auto-injector injection, Auvi-Q 0.3 mg/0.3 mL injection, auto-injector  Take 1 auto by injection route., Disp: , Rfl:     famotidine (PEPCID) 20 MG tablet, Take 1 tablet by mouth As Needed for Indigestion  "(allergies)., Disp: , Rfl:     glucosamine-chondroitin 500-400 MG capsule capsule, Take  by mouth 3 (Three) Times a Day With Meals., Disp: , Rfl:     levocetirizine (XYZAL) 5 MG tablet, levocetirizine 5 mg tablet, Disp: , Rfl:     meloxicam (MOBIC) 15 MG tablet, Take 1 tablet by mouth Daily., Disp: , Rfl:     montelukast (SINGULAIR) 10 MG tablet, Take 1 tablet by mouth Daily., Disp: , Rfl:     omalizumab (Xolair) 150 MG/ML solution prefilled syringe injection, 2 injections every 28 days, Disp: , Rfl:     pravastatin (PRAVACHOL) 40 MG tablet, TAKE 1 TABLET BY MOUTH DAILY, Disp: 90 tablet, Rfl: 3    spironolactone (ALDACTONE) 100 MG tablet, , Disp: , Rfl:     ROS    Review of Systems   Constitutional:  Negative for chills, fatigue and fever.   HENT:  Positive for congestion and postnasal drip. Negative for sore throat.    Eyes:  Negative for blurred vision and visual disturbance.   Respiratory:  Negative for cough, shortness of breath and wheezing.    Cardiovascular:  Negative for chest pain and leg swelling.   Gastrointestinal:  Negative for abdominal pain, constipation, diarrhea, nausea and vomiting.   Endocrine: Negative for cold intolerance and heat intolerance.   Genitourinary:  Negative for dysuria and frequency.   Musculoskeletal:  Negative for arthralgias and back pain.   Skin:  Negative for color change and rash.   Allergic/Immunologic: Positive for environmental allergies.   Neurological:  Negative for headache.   Hematological:  Does not bruise/bleed easily.   Psychiatric/Behavioral:  Negative for depressed mood. The patient is not nervous/anxious.        Vitals:    08/14/24 0812   BP: 98/80   Pulse: 80   Resp: 18   Temp: 98.1 °F (36.7 °C)   TempSrc: Temporal   SpO2: 97%   Weight: 58.6 kg (129 lb 3.2 oz)   Height: 147.3 cm (57.99\")   PainSc: 0-No pain     Body mass index is 27.01 kg/m².    Physical Exam     Physical Exam  Constitutional:       General: She is not in acute distress.     Appearance: Normal " appearance. She is well-developed.   HENT:      Head: Normocephalic and atraumatic.      Right Ear: Tympanic membrane and external ear normal.      Left Ear: Tympanic membrane and external ear normal.      Mouth/Throat:      Pharynx: Posterior oropharyngeal erythema (PND) present.   Eyes:      Extraocular Movements: Extraocular movements intact.      Conjunctiva/sclera: Conjunctivae normal.      Pupils: Pupils are equal, round, and reactive to light.   Cardiovascular:      Rate and Rhythm: Normal rate and regular rhythm.      Heart sounds: No murmur heard.  Pulmonary:      Effort: Pulmonary effort is normal. No respiratory distress.      Breath sounds: Normal breath sounds. No wheezing.   Abdominal:      General: Bowel sounds are normal. There is no distension.      Palpations: Abdomen is soft.      Tenderness: There is no abdominal tenderness.   Musculoskeletal:      Cervical back: Neck supple.      Right lower leg: No edema.      Left lower leg: No edema.   Lymphadenopathy:      Cervical: No cervical adenopathy.   Skin:     General: Skin is warm and dry.   Neurological:      Mental Status: She is alert and oriented to person, place, and time.      Cranial Nerves: No cranial nerve deficit.      Deep Tendon Reflexes: Reflexes normal.   Psychiatric:         Mood and Affect: Mood normal.         Behavior: Behavior normal.             Diagnoses and all orders for this visit:    1. Well adult exam (Primary)    2. Pure hypercholesterolemia  -     Lipid Panel    3. Postmenopausal  -     DEXA Bone Density Axial    4. Screening for blood disease  -     CBC & Differential    5. Screening for endocrine disorder  -     Comprehensive Metabolic Panel        Assessment & Plan  1. Well adult exam.  Routine health maintenance was discussed, including vaccines, dental/eye health, healthy diet and exercise, colorectal cancer screening, Pap smears, and mammograms. A bone density test has been ordered due to multiple fractures in her  foot. Routine labs have also been ordered.    2. Hyperlipidemia.  She will continue taking pravastatin. An updated lipid panel will be obtained.          No orders of the defined types were placed in this encounter.      No orders of the defined types were placed in this encounter.      Return in about 1 year (around 8/14/2025) for Annual.      Kaity Soto,

## 2024-08-15 ENCOUNTER — TELEPHONE (OUTPATIENT)
Dept: INTERNAL MEDICINE | Facility: CLINIC | Age: 59
End: 2024-08-15
Payer: COMMERCIAL

## 2024-08-15 LAB
ALBUMIN SERPL-MCNC: 4.6 G/DL (ref 3.8–4.9)
ALP SERPL-CCNC: 103 IU/L (ref 44–121)
ALT SERPL-CCNC: 27 IU/L (ref 0–32)
AST SERPL-CCNC: 31 IU/L (ref 0–40)
BASOPHILS # BLD AUTO: 0.1 X10E3/UL (ref 0–0.2)
BASOPHILS NFR BLD AUTO: 1 %
BILIRUB SERPL-MCNC: 0.4 MG/DL (ref 0–1.2)
BUN SERPL-MCNC: 12 MG/DL (ref 8–27)
BUN/CREAT SERPL: 15 (ref 12–28)
CALCIUM SERPL-MCNC: 9.5 MG/DL (ref 8.7–10.3)
CHLORIDE SERPL-SCNC: 100 MMOL/L (ref 96–106)
CHOLEST SERPL-MCNC: 201 MG/DL (ref 100–199)
CO2 SERPL-SCNC: 24 MMOL/L (ref 20–29)
CREAT SERPL-MCNC: 0.81 MG/DL (ref 0.57–1)
EGFRCR SERPLBLD CKD-EPI 2021: 83 ML/MIN/1.73
EOSINOPHIL # BLD AUTO: 0.1 X10E3/UL (ref 0–0.4)
EOSINOPHIL NFR BLD AUTO: 1 %
ERYTHROCYTE [DISTWIDTH] IN BLOOD BY AUTOMATED COUNT: 13.1 % (ref 11.7–15.4)
GLOBULIN SER CALC-MCNC: 2.3 G/DL (ref 1.5–4.5)
GLUCOSE SERPL-MCNC: 82 MG/DL (ref 70–99)
HCT VFR BLD AUTO: 43.6 % (ref 34–46.6)
HDLC SERPL-MCNC: 51 MG/DL
HGB BLD-MCNC: 14.1 G/DL (ref 11.1–15.9)
IMM GRANULOCYTES # BLD AUTO: 0.1 X10E3/UL (ref 0–0.1)
IMM GRANULOCYTES NFR BLD AUTO: 1 %
LDLC SERPL CALC-MCNC: 130 MG/DL (ref 0–99)
LYMPHOCYTES # BLD AUTO: 1 X10E3/UL (ref 0.7–3.1)
LYMPHOCYTES NFR BLD AUTO: 13 %
MCH RBC QN AUTO: 31.1 PG (ref 26.6–33)
MCHC RBC AUTO-ENTMCNC: 32.3 G/DL (ref 31.5–35.7)
MCV RBC AUTO: 96 FL (ref 79–97)
MONOCYTES # BLD AUTO: 0.6 X10E3/UL (ref 0.1–0.9)
MONOCYTES NFR BLD AUTO: 8 %
NEUTROPHILS # BLD AUTO: 5.7 X10E3/UL (ref 1.4–7)
NEUTROPHILS NFR BLD AUTO: 76 %
PLATELET # BLD AUTO: 284 X10E3/UL (ref 150–450)
POTASSIUM SERPL-SCNC: 4.5 MMOL/L (ref 3.5–5.2)
PROT SERPL-MCNC: 6.9 G/DL (ref 6–8.5)
RBC # BLD AUTO: 4.53 X10E6/UL (ref 3.77–5.28)
SODIUM SERPL-SCNC: 139 MMOL/L (ref 134–144)
TRIGL SERPL-MCNC: 111 MG/DL (ref 0–149)
VLDLC SERPL CALC-MCNC: 20 MG/DL (ref 5–40)
WBC # BLD AUTO: 7.5 X10E3/UL (ref 3.4–10.8)

## 2024-08-15 NOTE — TELEPHONE ENCOUNTER
----- Message from Kaity Soto sent at 8/15/2024 12:44 PM EDT -----  Results have been reviewed. Please notify patient of normal results.  Cholesterol is much better.  Continue pravastatin.

## 2024-10-11 ENCOUNTER — OFFICE VISIT (OUTPATIENT)
Dept: OBSTETRICS AND GYNECOLOGY | Facility: CLINIC | Age: 59
End: 2024-10-11
Payer: COMMERCIAL

## 2024-10-11 VITALS
WEIGHT: 126 LBS | BODY MASS INDEX: 27.18 KG/M2 | DIASTOLIC BLOOD PRESSURE: 68 MMHG | SYSTOLIC BLOOD PRESSURE: 98 MMHG | HEIGHT: 57 IN

## 2024-10-11 DIAGNOSIS — Z01.419 ROUTINE GYNECOLOGICAL EXAMINATION: Primary | ICD-10-CM

## 2024-10-11 NOTE — PROGRESS NOTES
Gynecologic Annual Exam Note        GYN Annual Exam     CC - Here for annual exam.        HPI  Janis Klein is a 58 y.o. female, , who presents for annual well woman exam as an established patient.  She is postmenopausal.. Denies vaginal bleeding.   There were no changes to her medical or surgical history since her last visit. Marital Status: .  She is not currently sexually active. STD testing recommendations have been explained to the patient and she declines STD testing.    The patient would like to discuss the following complaints today: none    Additional OB/GYN History   On HRT? No    Last Pap : 23. Results: negative. HPV: negative.   Last Completed Pap Smear            PAP SMEAR (Every 3 Years) Order placed this encounter      2023  LIQUID-BASED PAP SMEAR WITH HPV GENOTYPING REGARDLESS OF INTERPRETATION ([facility])    2022  LIQUID-BASED PAP SMEAR, [facility] ([facility])    2021  SCANNED - PAP SMEAR    2020  Done - [place]-Neg                  History of abnormal Pap smear: yes - dysplasia  Family history of uterine, colon, breast, or ovarian cancer: no  Performs monthly Self-Breast Exam: yes  Last mammogram: 24. Done at . There is a copy in the chart.    Last Completed Mammogram            MAMMOGRAM (Yearly) Next due on 2024  Mammo Screening Digital Tomosynthesis Bilateral With CAD    2023  Mammo Screening Digital Tomosynthesis Bilateral With CAD    2022  Mammo Screening Digital Tomosynthesis Bilateral With CAD    2020  Mammo Screening Digital Tomosynthesis Bilateral With CAD    2019  Mammo Screening Digital Tomosynthesis Bilateral With CAD    Only the first 5 history entries have been loaded, but more history exists.                  Last colonoscopy:  -not due again until     Last Completed Colonoscopy            COLORECTAL CANCER SCREENING (COLONOSCOPY - Every 10 Years) Next due on 2032       2022  SCANNED - COLONOSCOPY    2015  COLONOSCOPY (Done - [place])    2015  SCANNED - COLONOSCOPY                    She has never had a bone density scan, is scheduled for November due to a fracture in her foot.   Exercises Regularly: yes  Feelings of Anxiety or Depression: no      Tobacco Usage?: No       Current Outpatient Medications:     Aller-Chlor 4 MG tablet, Take 1 tablet by mouth Every Evening., Disp: , Rfl:     ALLERGY SERUM SUBLINGUAL DROPS, Place  under the tongue 1 (One) Time., Disp: , Rfl:     EPINEPHrine (EPIPEN) 0.3 MG/0.3ML solution auto-injector injection, Auvi-Q 0.3 mg/0.3 mL injection, auto-injector  Take 1 auto by injection route., Disp: , Rfl:     famotidine (PEPCID) 20 MG tablet, Take 1 tablet by mouth As Needed for Indigestion (allergies)., Disp: , Rfl:     glucosamine-chondroitin 500-400 MG capsule capsule, Take  by mouth 3 (Three) Times a Day With Meals., Disp: , Rfl:     levocetirizine (XYZAL) 5 MG tablet, levocetirizine 5 mg tablet, Disp: , Rfl:     montelukast (SINGULAIR) 10 MG tablet, Take 1 tablet by mouth Daily., Disp: , Rfl:     omalizumab (Xolair) 150 MG/ML solution prefilled syringe injection, 2 injections every 28 days, Disp: , Rfl:     pravastatin (PRAVACHOL) 40 MG tablet, TAKE 1 TABLET BY MOUTH DAILY, Disp: 90 tablet, Rfl: 3    spironolactone (ALDACTONE) 100 MG tablet, , Disp: , Rfl:     Patient denies the need for medication refills today.    OB History          3    Para   2    Term   2            AB   1    Living   2         SAB   1    IAB        Ectopic        Molar        Multiple        Live Births   2                Past Medical History:   Diagnosis Date    Cervical high risk human papillomavirus (HPV) DNA test positive 2014    [place]-16/18 neg 14    Cervical stricture or stenosis 2014    [place]    Dysmenorrhea     Irregular menses     [place]    IUD check up 2011    [place]-Mirena-removal 2014     "Menorrhagia     Moderate dysplasia of cervix (ODILIA II) 05/12/2014    [place]        Past Surgical History:   Procedure Laterality Date    BREAST BIOPSY Right 2008    Stereotactic    CERVICAL CONE BIOPSY      [place]-05/27/14- CINII for cervix and +ECC    COLPOSCOPY      CRYOABLATION      1991-carcinoma in situ    INTERNAL MAXILLARY ARTERY AND ETHMOID ARTERY LIGATION Right 10/2022    LASIK      2004-[place]    PRE-MALIGNANT / BENIGN SKIN LESION EXCISION      12/2021-[place]    WISDOM TOOTH EXTRACTION  approx 1984       Health Maintenance   Topic Date Due    DXA SCAN  Never done    HEPATITIS C SCREENING  Never done    BMI FOLLOWUP  07/11/2024    COVID-19 Vaccine (4 - 2023-24 season) 09/01/2024    Annual Gynecologic Pelvic and Breast Exam  09/21/2024    MAMMOGRAM  08/08/2025    ANNUAL PHYSICAL  08/14/2025    LIPID PANEL  08/14/2025    PAP SMEAR  09/20/2026    COLORECTAL CANCER SCREENING  05/22/2032    TDAP/TD VACCINES (2 - Td or Tdap) 07/29/2034    INFLUENZA VACCINE  Completed    ZOSTER VACCINE  Completed    Pneumococcal Vaccine 0-64  Aged Out       The additional following portions of the patient's history were reviewed and updated as appropriate: allergies, current medications, past family history, past medical history, past social history, past surgical history, and problem list.    Review of Systems   Constitutional: Negative.    HENT: Negative.     Eyes: Negative.    Respiratory: Negative.     Cardiovascular: Negative.    Gastrointestinal: Negative.    Endocrine: Negative.    Genitourinary: Negative.    Musculoskeletal: Negative.    Skin: Negative.    Allergic/Immunologic: Negative.    Neurological: Negative.    Hematological: Negative.    Psychiatric/Behavioral: Negative.         I have reviewed and agree with the HPI, ROS, and historical information as entered above. Pauline Beavers, APRN      Objective   BP 98/68   Ht 144.8 cm (57\")   Wt 57.2 kg (126 lb)   BMI 27.27 kg/m²     Physical Exam  Vitals and nursing " note reviewed. Exam conducted with a chaperone present.   Constitutional:       General: She is not in acute distress.     Appearance: Normal appearance. She is well-developed and normal weight. She is not ill-appearing.   Neck:      Thyroid: No thyroid mass or thyromegaly.   Pulmonary:      Effort: Pulmonary effort is normal. No respiratory distress or retractions.   Chest:      Chest wall: No mass.   Breasts:     Right: Normal. No mass, nipple discharge, skin change or tenderness.      Left: Normal. No mass, nipple discharge, skin change or tenderness.   Abdominal:      General: There is no distension.      Palpations: Abdomen is soft. Abdomen is not rigid. There is no mass.      Tenderness: There is no abdominal tenderness. There is no guarding or rebound.      Hernia: No hernia is present.   Genitourinary:     General: Normal vulva.      Exam position: Lithotomy position.      Labia:         Right: No rash, tenderness or lesion.         Left: No rash, tenderness or lesion.       Vagina: Normal. No vaginal discharge or lesions.      Cervix: Normal. No cervical motion tenderness or friability.      Uterus: Normal. Not enlarged, not fixed and not tender.       Adnexa: Right adnexa normal and left adnexa normal.        Right: No mass or tenderness.          Left: No mass or tenderness.        Rectum: Normal. No external hemorrhoid.      Comments: Atrophy noted  Musculoskeletal:      Cervical back: No muscular tenderness.   Skin:     General: Skin is warm and dry.   Neurological:      Mental Status: She is alert and oriented to person, place, and time.   Psychiatric:         Mood and Affect: Mood normal.         Behavior: Behavior normal.            Assessment and Plan    Problem List Items Addressed This Visit    None  Visit Diagnoses       Routine gynecological examination    -  Primary    Relevant Orders    LIQUID-BASED PAP SMEAR WITH HPV GENOTYPING IF ASCUS ([facility])            GYN annual well woman exam.  Reviewed pap guidelines, done per pt preference due to her history.   Reviewed monthly self breast exams.  Instructed to call with lumps, pain, or breast discharge.  Yearly mammograms ordered.  Reviewed [facility] ovarian cancer screening program.  RTC in 1 year or PRN with problems.  Return in about 1 year (around 10/11/2025) for Annual physical.         Pauline Beavers, APRN  10/11/2024

## 2024-10-15 LAB — REF LAB TEST METHOD: NORMAL

## 2024-11-27 ENCOUNTER — APPOINTMENT (OUTPATIENT)
Dept: BONE DENSITY | Facility: HOSPITAL | Age: 59
End: 2024-11-27
Payer: COMMERCIAL

## 2024-11-27 PROCEDURE — 77080 DXA BONE DENSITY AXIAL: CPT

## 2024-12-12 ENCOUNTER — TELEPHONE (OUTPATIENT)
Dept: INTERNAL MEDICINE | Facility: CLINIC | Age: 59
End: 2024-12-12
Payer: COMMERCIAL

## 2024-12-12 NOTE — TELEPHONE ENCOUNTER
----- Message from Kaity Soto sent at 12/11/2024  2:30 PM EST -----  Results have been reviewed. Please notify patient of abnormal results.  Osteopenia present.  I would recommend taking at least calcium with vitamin D, but would also benefit from a medication like fosamax to slow the progression.  Please let me know if agreeable to fosamax and I will send in.

## 2025-03-24 ENCOUNTER — OFFICE VISIT (OUTPATIENT)
Dept: INTERNAL MEDICINE | Facility: CLINIC | Age: 60
End: 2025-03-24
Payer: COMMERCIAL

## 2025-03-24 VITALS
BODY MASS INDEX: 29.12 KG/M2 | DIASTOLIC BLOOD PRESSURE: 82 MMHG | TEMPERATURE: 98 F | HEIGHT: 57 IN | OXYGEN SATURATION: 98 % | WEIGHT: 135 LBS | SYSTOLIC BLOOD PRESSURE: 122 MMHG | HEART RATE: 68 BPM

## 2025-03-24 DIAGNOSIS — E66.3 OVERWEIGHT (BMI 25.0-29.9): Primary | ICD-10-CM

## 2025-03-24 RX ORDER — METFORMIN HYDROCHLORIDE 500 MG/1
1000 TABLET, EXTENDED RELEASE ORAL
Qty: 180 TABLET | Refills: 3 | Status: SHIPPED | OUTPATIENT
Start: 2025-03-24 | End: 2025-04-02 | Stop reason: SINTOL

## 2025-03-24 RX ORDER — CHOLECALCIFEROL (VITAMIN D3) 25 MCG
1000 TABLET ORAL DAILY
COMMUNITY

## 2025-03-24 NOTE — PROGRESS NOTES
Janis Klein is a 59 y.o. female.    Chief Complaint   Patient presents with    Weight Loss       HPI   History of Present Illness  The patient presents to discuss weight loss.    She seeks pharmacological intervention for weight management, preferring oral medications over injectables. She reports a family history of diabetes and obesity, with her paternal grandmother diabetic, her mother morbidly obese with elevated A1c levels, and both brothers obese, one of whom is diabetic. She is dissatisfied with her weight. Despite avoiding soda and sweets, she struggles with portion control and inadequate protein intake. Since January 2025, she has ensured sufficient calcium and protein in her diet and maintains a food journal. Her physical activity is limited due to a foot fracture, but she walks for 20 minutes daily and engages in yard work, gardening, and core exercises, although not as frequently as recommended. She also does stretching exercises for her back, which cannot tolerate the exercises as before, causing a burning sensation across the bottom of her back. She maintains a high-fiber diet, including a daily fiber supplement in her morning coffee. She reports no chest pain, shortness of breath, nausea, vomiting, cough, congestion, or runny nose. She reports no leg swelling but notes occasional swelling in her previously fractured foot. She typically consumes breakfast around 8:00 AM after waking up at 4:30 AM daily. She has been under significant stress due to the recent loss of her grandmother and a traumatic incident involving her son. She believes her sleep pattern affects her weight loss. She takes medication for cholesterol management and has reduced her sullivan intake.        The following portions of the patient's history were reviewed and updated as appropriate: allergies, current medications, past family history, past medical history, past social history, past surgical history and problem list.    "  Allergies   Allergen Reactions    Codeine Hives and Unknown - Low Severity    Nickel Hives and Unknown - Low Severity    Sulfa Antibiotics Hives         Current Outpatient Medications:     Aller-Chlor 4 MG tablet, Take 1 tablet by mouth Every Evening., Disp: , Rfl:     ALLERGY SERUM SUBLINGUAL DROPS, Place  under the tongue 1 (One) Time., Disp: , Rfl:     Cholecalciferol 25 MCG (1000 UT) tablet, Take 1 tablet by mouth Daily., Disp: , Rfl:     EPINEPHrine (EPIPEN) 0.3 MG/0.3ML solution auto-injector injection, Auvi-Q 0.3 mg/0.3 mL injection, auto-injector  Take 1 auto by injection route., Disp: , Rfl:     famotidine (PEPCID) 20 MG tablet, Take 1 tablet by mouth As Needed for Indigestion (allergies)., Disp: , Rfl:     glucosamine-chondroitin 500-400 MG capsule capsule, Take 1 capsule by mouth Every Night., Disp: , Rfl:     levocetirizine (XYZAL) 5 MG tablet, levocetirizine 5 mg tablet, Disp: , Rfl:     montelukast (SINGULAIR) 10 MG tablet, Take 1 tablet by mouth Daily., Disp: , Rfl:     omalizumab (Xolair) 150 MG/ML solution prefilled syringe injection, 2 injections every 28 days, Disp: , Rfl:     pravastatin (PRAVACHOL) 40 MG tablet, TAKE 1 TABLET BY MOUTH DAILY, Disp: 90 tablet, Rfl: 3    spironolactone (ALDACTONE) 100 MG tablet, , Disp: , Rfl:     buPROPion XL (Wellbutrin XL) 150 MG 24 hr tablet, Take 1 tablet by mouth Daily., Disp: 90 tablet, Rfl: 3    ROS    Review of Systems   Constitutional:  Negative for chills and fever.   HENT:  Negative for congestion.    Respiratory:  Negative for cough and shortness of breath.    Cardiovascular:  Negative for chest pain.   Gastrointestinal:  Negative for constipation, diarrhea, nausea and vomiting.       Vitals:    03/24/25 1411   BP: 122/82   Pulse: 68   Temp: 98 °F (36.7 °C)   SpO2: 98%   Weight: 61.2 kg (135 lb)   Height: 144.8 cm (57\")   PainSc: 1      Body mass index is 29.21 kg/m².      Physical Exam     Physical Exam  Constitutional:       General: She is not in " acute distress.     Appearance: Normal appearance. She is well-developed.   HENT:      Head: Normocephalic and atraumatic.      Right Ear: External ear normal.      Left Ear: External ear normal.   Eyes:      Extraocular Movements: Extraocular movements intact.      Conjunctiva/sclera: Conjunctivae normal.   Cardiovascular:      Rate and Rhythm: Normal rate and regular rhythm.      Heart sounds: No murmur heard.  Pulmonary:      Effort: Pulmonary effort is normal. No respiratory distress.      Breath sounds: Normal breath sounds. No wheezing.   Abdominal:      General: Bowel sounds are normal. There is no distension.      Palpations: Abdomen is soft.      Tenderness: There is no abdominal tenderness.   Musculoskeletal:      Right lower leg: No edema.      Left lower leg: No edema.   Lymphadenopathy:      Cervical: No cervical adenopathy.   Skin:     General: Skin is warm and dry.   Neurological:      Mental Status: She is alert and oriented to person, place, and time.      Cranial Nerves: No cranial nerve deficit.   Psychiatric:         Mood and Affect: Mood normal.         Behavior: Behavior normal.             Diagnoses and all orders for this visit:    1. Overweight (BMI 25.0-29.9) (Primary)    Other orders  -     Discontinue: metFORMIN ER (GLUCOPHAGE-XR) 500 MG 24 hr tablet; Take 2 tablets by mouth Daily With Breakfast.  Dispense: 180 tablet; Refill: 3        Assessment & Plan  1. Overweight..  Advised to maintain a healthy diet and regular exercise regimen. Discussed potential side effects of metformin, including GI upset, which may resolve within a week or two. Kidney function will be monitored during fall labs. Prescribed extended-release metformin 500 mg, two tablets in the morning with breakfast. If no benefit after a couple of months, alternative treatments will be considered.        No orders of the defined types were placed in this encounter.      No orders of the defined types were placed in this  encounter.      Return if symptoms worsen or fail to improve, for Next scheduled follow up.    Kaity Soto, DO

## 2025-03-31 ENCOUNTER — TELEPHONE (OUTPATIENT)
Dept: INTERNAL MEDICINE | Facility: CLINIC | Age: 60
End: 2025-03-31
Payer: COMMERCIAL

## 2025-03-31 NOTE — TELEPHONE ENCOUNTER
Caller: Janis Klein    Relationship: Self    Best call back number: [phone]     Which medication are you concerned about: METFORMIN    Who prescribed you this medication: DC    When did you start taking this medication: LAST TUESDAY    What are your concerns: STOMACH PAIN    How long have you had these concerns: SINCE FRIDAY

## 2025-04-02 RX ORDER — BUPROPION HYDROCHLORIDE 150 MG/1
150 TABLET ORAL DAILY
Qty: 90 TABLET | Refills: 3 | Status: SHIPPED | OUTPATIENT
Start: 2025-04-02

## 2025-06-30 RX ORDER — PRAVASTATIN SODIUM 40 MG
40 TABLET ORAL DAILY
Qty: 90 TABLET | Refills: 3 | Status: SHIPPED | OUTPATIENT
Start: 2025-06-30